# Patient Record
Sex: MALE | Employment: STUDENT | URBAN - METROPOLITAN AREA
[De-identification: names, ages, dates, MRNs, and addresses within clinical notes are randomized per-mention and may not be internally consistent; named-entity substitution may affect disease eponyms.]

---

## 2019-06-17 ENCOUNTER — NURSE TRIAGE (OUTPATIENT)
Dept: NURSING | Facility: CLINIC | Age: 24
End: 2019-06-17

## 2019-06-17 ENCOUNTER — APPOINTMENT (OUTPATIENT)
Dept: ULTRASOUND IMAGING | Facility: CLINIC | Age: 24
End: 2019-06-17
Attending: EMERGENCY MEDICINE
Payer: COMMERCIAL

## 2019-06-17 ENCOUNTER — HOSPITAL ENCOUNTER (EMERGENCY)
Facility: CLINIC | Age: 24
Discharge: HOME | End: 2019-06-17
Attending: EMERGENCY MEDICINE | Admitting: EMERGENCY MEDICINE
Payer: COMMERCIAL

## 2019-06-17 VITALS
DIASTOLIC BLOOD PRESSURE: 75 MMHG | HEART RATE: 73 BPM | TEMPERATURE: 98.2 F | OXYGEN SATURATION: 100 % | SYSTOLIC BLOOD PRESSURE: 125 MMHG | RESPIRATION RATE: 18 BRPM

## 2019-06-17 DIAGNOSIS — S86.811A STRAIN OF CALF MUSCLE, RIGHT, INITIAL ENCOUNTER: ICD-10-CM

## 2019-06-17 PROCEDURE — 99284 EMERGENCY DEPT VISIT MOD MDM: CPT | Mod: 25

## 2019-06-17 PROCEDURE — 93971 EXTREMITY STUDY: CPT | Mod: RT

## 2019-06-17 NOTE — ED AVS SNAPSHOT
[facility] Emergency Department  [address]  [facility] [address]  Phone:  [phone]  Fax:  [phone]                                    Micah Ulloa   MRN: [id number]    Department:  [facility] Emergency Department   Date of Visit:  6/17/2019           After Visit Summary Signature Page    I have received my discharge instructions, and my questions have been answered. I have discussed any challenges I see with this plan with the nurse or doctor.    ..........................................................................................................................................  Patient/Patient Representative Signature      ..........................................................................................................................................  Patient Representative Print Name and Relationship to Patient    ..................................................               ................................................  Date                                   Time    ..........................................................................................................................................  Reviewed by Signature/Title    ...................................................              ..............................................  Date                                               Time          22EPIC Rev 08/18

## 2019-06-17 NOTE — TELEPHONE ENCOUNTER
"Pt calls in with concern of right calf and top of foot swelling     States \" I think I sprained my ankle last week \"    The last few days pt has noticed increased \"swelling\" especially of right calf     Has been going to work ( today as well )- and has been working out with minimal discomfort    Today rates pain about 3/10 -right calf    Per protocol pt advised to have calf-foot evaluated by a provider hopefully today     Pt then states he is a [facility] patient    Pt again advised to be evaluated and then given [facility] Nurse Line to call them to see where would be best place within [facility] system to go and have leg evaluated    Protocol and care advice reviewed  Caller states understanding of the recommended disposition    Advised to call back if further questions or concerns   Robert Street , RN / [facility]      Additional Information    Negative: Major bleeding (actively dripping or spurting) that can't be stopped    Negative: Bullet, stabbed by knife, or other serious penetrating wound    Negative: Looks like a dislocated joint (crooked or deformed)    Negative: Can't stand (bear weight) or walk    Negative: Sounds like a life-threatening emergency to the triager    Negative: SEVERE pain (e.g. excruciating)    Negative: Skin is split open or gaping (or length > 1/2 inch or 12 mm)    Negative: Bleeding won't stop after 10 minutes of direct pressure (using correct technique)    Negative: Dirt in the wound and not removed with 15 minutes of scrubbing    Negative: Sounds like a serious injury to the triager    Negative: Looks infected (e.g., spreading redness, pus, red streak)    Large swelling or bruise and size > palm of person's hand    Protocols used: LEG INJURY-A-OH      "

## 2019-06-18 ASSESSMENT — ENCOUNTER SYMPTOMS: MYALGIAS: 1

## 2019-06-18 NOTE — DISCHARGE INSTRUCTIONS
Please make an appointment to follow up with your primary care provider in 7-10 days if not improving.

## 2019-06-18 NOTE — ED TRIAGE NOTES
Sprain right ankle about 1-2 weeks ago. Yesterday, patient noticed slight pain to right calf and worsen today with slight swelling, call nurse line and was advised to come to rule out blood clot. ABCs intact.

## 2019-06-18 NOTE — ED PROVIDER NOTES
History     Chief Complaint:  Leg pain    HPI   Micah Ulloa is a 24 year old male who presents with leg pain. The patient reported that approximately 2 weeks ago he was playing basketball when he jumped for a shot and someone hit him causing him to land on his ankle weird and cause an inversion sprain of his right ankle. The patient reported that yesterday he noticed that his right calf was swollen and warm to the touch with no associated leg pain. Nothing alleviates or exacerbates his symptoms. His mother prompted presentation to the ED with concern for blood clots. He has taken no medications for pain management.     Allergies:  The patient has no known drug allergies.     Medications:    The patient is currently on no regular medications.    Past Medical History:    The patient denies any significant past medical history.    Past Surgical History:    The patient does not have any pertinent past surgical history.    Family History:    No past pertinent family history.    Social History:  Negative for tobacco use.  Negative for alcohol use.  Negative for drug use.     Review of Systems   Cardiovascular: Positive for leg swelling.   Musculoskeletal: Positive for myalgias.   All other systems reviewed and are negative.      Physical Exam     Patient Vitals for the past 24 hrs:   BP Temp Temp src Pulse Heart Rate Resp SpO2   06/17/19 2029 125/75 98.2  F (36.8  C) Oral 73 73 18 100 %       Physical Exam    General:   Pleasant, age appropriate.      Resting comfortably in the bed.  Eyes:    Conjunctiva normal  Neck:    Supple, no meningismus.     CV:     Regular rate and rhythm.      No murmurs, rubs or gallops.       No unilateral leg swelling.       2+ DP pulses bilateral.       No lower extremity edema.  PULM:    Clear to auscultation bilateral.       No respiratory distress.      Good air exchange.  ABD:    Soft, non-tender, non-distended.       No pulsatile masses.       No rebound, guarding or rigidity.  MSK:      Right lower extremity:      No appreciable swelling.      No pain with compression of the calf.      Negative Toby's sign.      Compartments are soft and compressible.   LYMPH:   No cervical lymphadenopathy.  NEURO:   Alert and oriented x 3.      Strength and sensation intact to the lower extremities.  Skin:    Warm, dry and intact.    Psych:    Mood is good and affect is appropriate.      Emergency Department Course   Imaging:  Radiographic findings were communicated with the patient who voiced understanding of the findings.  US Lower Extremity Venous Duplex, right:  No evidence of deep venous thrombosis, as per radiology.     Emergency Department Course:  Nursing notes and vitals reviewed. () I performed an exam of the patient as documented above.     The patient was sent for a lower extremity US while in the emergency department, findings above.     Findings and plan explained to the Patient. Patient discharged home with instructions regarding supportive care, medications, and reasons to return. The importance of close follow-up was reviewed.   I personally reviewed the laboratory results with the Patient and answered all related questions prior to discharge.     Impression & Plan    Medical Decision Makin-year-old male presented to the ED with right calf pain after recent right ankle sprain.  On examination he has no evidence of arterial insufficiency.  Achilles tendon intact.  No bony tenderness to draw concern for underlying fracture.  Doppler ultrasound is negative for DVT.  Patient likely had some degree of gastrocnemius strain with his injury causing his calf pain today.  Supportive measures indicated.  Patient to follow with PCP if symptoms not improved.    Diagnosis:    ICD-10-CM    1. Strain of calf muscle, right, initial encounter S86.811A        Disposition:  discharged to home  Scribe Disclosure:  Kesha DAVIS, am serving as a scribe on 2019 at 12:22 AM to personally document  services performed by No att. providers found based on my observations and the provider's statements to me.         Kesha Mora  6/17/2019   [facility] EMERGENCY DEPARTMENT       Jaden Valadez MD  06/19/19 0749

## 2020-03-11 ENCOUNTER — NURSE TRIAGE (OUTPATIENT)
Dept: NURSING | Facility: CLINIC | Age: 25
End: 2020-03-11

## 2020-03-11 NOTE — TELEPHONE ENCOUNTER
"Patient calling reporting for the past 2 days \"I am getting more and more sick.\" Reporting similar symptoms \"to the Coronavirus.\"    Call disconnected prior to further information. Attempted to reach patient at  x 3 and call rolls directly to voice mail.     Shakira Xavier RN  [facility]      "

## 2020-03-11 NOTE — TELEPHONE ENCOUNTER
Micah calls and says that he is SOB and congested. Pt. Says that he also has a dry cough. Dry cough began 5 days ago. Pt. Refuses to go to the ER and wants to be tested for the Corona virus. Pt. Says that he will go to the [facility] clinic. 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 RN then called the [facility] and spoke to Kendra--who says that she has been trained to handle the [place] Virus. RN told Kendra about pt's car and his phone #. Kendra voiced understanding.  Reason for Disposition    [1] No CORONAVIRUS EXPOSURE BUT [2] questions about    [1] MODERATE difficulty breathing (e.g., speaks in phrases, SOB even at rest, pulse 100-120) AND [2] NEW-onset or WORSE than normal    Additional Information    Negative: Severe difficulty breathing (e.g., struggling for each breath, speak in single words, bluish lips)    Negative: Sounds like a life-threatening emergency to the triager    Negative: [1] Difficulty breathing (shortness of breath) occurs AND [2] onset > 14 days after CORONAVIRUS EXPOSURE (Close Contact)    Negative: [1] Dry cough occurs AND [2] onset > 14 days after CORONAVIRUS EXPOSURE    Negative: [1] Wet cough (i.e., white-yellow, yellow, green, or alok colored sputum) AND [2] onset > 14 days after CORONAVIRUS EXPOSURE    Negative: [1] Common cold symptoms AND [2] onset > 14 days after CORONAVIRUS EXPOSURE    Negative: [1] Difficulty breathing occurs AND [2] within 14 days of CORONAVIRUS EXPOSURE    Negative: Patient sounds very sick or weak to the triager    Negative: [1] Fever or feeling feverish AND [2] within 14 Days of CORONAVIRUS EXPOSURE    Negative: [1] Cough occurs AND [2] within 14 days of CORONAVIRUS EXPOSURE    Negative: [1] Fever or feeling feverish AND [2] symptoms of lower respiratory illness (e.g., cough, difficulty breathing) AND [3] TRAVEL FROM [place] within last 14 days    Negative:  [1] Body aches, chills, diarrhea, headache, runny nose, or sore throat occur AND [2] within 14 days of CORONAVIRUS EXPOSURE    Negative: [1] CORONAVIRUS EXPOSURE within last 14 days AND [2] NO cough, fever, or breathing difficulty    Negative: [1] TRAVEL FROM [place] in last 14 days AND  [2] NO cough or fever or breathing difficulty    Negative: [1] CORONAVIRUS EXPOSURE 15 or more days ago AND [2] NO cough or fever or breathing difficulty    Negative: [1] Breathing stopped AND [2] hasn't returned    Negative: Choking on something    Negative: Severe difficulty breathing (e.g., struggling for each breath, speaks in single words)    Negative: Bluish (or gray) lips or face now    Negative: Difficult to awaken or acting confused (e.g., disoriented, slurred speech)    Negative: Passed out (i.e., lost consciousness, collapsed and was not responding)    Negative: Wheezing started suddenly after medicine, an allergic food or bee sting    Negative: Stridor    Negative: Slow, shallow and weak breathing    Negative: Sounds like a life-threatening emergency to the triager    Negative: Chest pain    Negative: [1] Wheezing (high pitched whistling sound) AND [2] previous asthma attacks or use of asthma medicines    Negative: [1] Difficulty breathing AND [2] only present when coughing    Negative: [1] Difficulty breathing AND [2] only from stuffy or runny nose    Protocols used: CORONAVIRUS (2019-NCOV) EXPOSURE-A-AH, BREATHING DIFFICULTY-A-AH

## 2020-03-12 NOTE — TELEPHONE ENCOUNTER
Pt. Reports no symptoms, no travel or exposure.  Consulted with Dr. Arnold as to testing protocol  Found Pt. Was not in need of testing.  Informed Pt. To self quarantine and if condition worsens to go to [email or web address],    Sil Godinez CMA on 3/11/2020 at 7:49 PM

## 2020-03-24 ENCOUNTER — NURSE TRIAGE (OUTPATIENT)
Dept: NURSING | Facility: CLINIC | Age: 25
End: 2020-03-24

## 2020-03-24 ENCOUNTER — VIRTUAL VISIT (OUTPATIENT)
Dept: FAMILY MEDICINE | Facility: OTHER | Age: 25
End: 2020-03-24

## 2020-03-24 NOTE — TELEPHONE ENCOUNTER
Last week Micah had a cold.  Went to [facility] and was told to stay home.  Yesterday having pain in chest and tight breathing.  Today is coughing dry.   Hears  a wheezing noise.  Micah feels he has a fever.  Micah is also having shortness of breath.      Instructions for Patients  It is recommended that you setup a virtual visit with one of our virtual providers.  To do this follow these instructions:    1. Go to the website [email or web address]  2. Create an account (you will need your insurance information)  3. Start a new visit  4. Choose your diagnosis (e.g. COVID19)  5. Fill out the information about your symptoms  6. A provider will reach out to you by text, phone call or video visit based on your request    While you are at home please follow these instructions to care for yourself:    Regardless of if you have been tested or not:  Patient who have symptoms (cough, fever, or shortness of breath), need to isolate for 7 days from when symptoms started OR 72 hours after fever resolves (without fever reducing medications) AND improvement of respiratory symptoms (whichever is longer).      Isolate yourself at home (in own room/own bathroom if possible)    Do Not allow any visitors    Do Not go to work or school    Do Not go to [employer],  centers, shopping, or other public places.    Do Not shake hands.    Avoid close and intimate contact with others (hugging, kissing).    Follow CDC recommendations for household cleaning of frequently touched services.     After the initial 7 days, continue to isolate yourself from household members as much as possible. To continue decrease the risk of community spread and exposure, you and any members of your household should limit activities in public for 14 days after starting home isolation.     You can reference the following CDC link for helpful home isolation/care tips:  https://www.cdc.gov/coronavirus/2019-ncov/downloads/10Things.pdf    Protect Others:    Cover Your  Mouth and Nose with a mask, disposable tissue or wash cloth to avoid spreading germs to others.    Wash your hands and face frequently with soap and water.    Fever Medicines:    For fever relief, take acetaminophen or ibuprofen.    Treat fevers above 101  F (38.3  C) to lower fevers and make you more comfortable.     Acetaminophen (e.g., Tylenol): Take 650 mg (two 325 mg pills) by mouth every 4-6 hours as needed of regular strength Tylenol or 1,000 mg (two 500 mg pills) every 8 hours as needed of Extra Strength Tylenol.     Ibuprofen (e.g., Motrin, Advil): Take 400 mg (two 200 mg pills) by mouth every 6 hours as needed.     Acetaminophen is thought to be safer than ibuprofen or naproxen for people over 65 years old. Acetaminophen is in many OTC and prescription medicines. It might be in more than one medicine that you are taking. You need to be careful and not take an overdose. Before taking any medicine, read all the instructions on the package.    Caution - NSAIDs (e.g., ibuprofen, naproxen): Do not take nonsteroidal anti-inflammatory drugs (NSAIDs) if you have stomach problems, kidney disease, heart failure, or other contraindications to using this type of medicine. Do not take NSAID medicines for over 7 days without consulting your PCP. Do not take NSAID medicines if you are pregnant. Do not take NSAID medicines if you are also taking blood thinners.     Call Back If: Breathing difficulty develops or you become worse.    Thank you for limiting contact with others, wearing a simple mask to cover your cough, practice good hand hygiene habits and accessing our virtual services where possible to limit the spread of this virus.    For more information about COVID19 and options for caring for yourself at home, please visit the CDC website at https://www.cdc.gov/coronavirus/2019-ncov/about/steps-when-sick.html  For more options for care at [facility], please visit our website at  [email or web address]       Reason for Disposition    [1] MILD longstanding difficulty breathing AND [2]  SAME as normal    Additional Information    Negative: [1] Breathing stopped AND [2] hasn't returned    Negative: Choking on something    Negative: Severe difficulty breathing (e.g., struggling for each breath, speaks in single words)    Negative: Bluish (or gray) lips or face now    Negative: Difficult to awaken or acting confused (e.g., disoriented, slurred speech)    Negative: Passed out (i.e., lost consciousness, collapsed and was not responding)    Negative: Wheezing started suddenly after medicine, an allergic food or bee sting    Negative: Stridor    Negative: Slow, shallow and weak breathing    Negative: Sounds like a life-threatening emergency to the triager    Negative: [1] MODERATE difficulty breathing (e.g., speaks in phrases, SOB even at rest, pulse 100-120) AND [2] NEW-onset or WORSE than normal    Negative: Wheezing can be heard across the room    Negative: Drooling or spitting out saliva (because can't swallow)    Negative: History of prior \"blood clot\" in leg or lungs (i.e., deep vein thrombosis, pulmonary embolism)    Negative: History of inherited increased risk of blood clots (e.g., Factor 5 Leiden, Anti-thrombin 3, Protein C or Protein S deficiency, Prothrombin mutation)    Negative: Recent illness requiring prolonged bedrest (i.e., immobilization)    Negative: Hip or leg fracture in past 2 months (e.g., had cast on leg or ankle)    Negative: Major surgery in the past month    Negative: Recent long-distance travel with prolonged time in car, bus, plane, or train (i.e., within past 2 weeks; 6 or  more hours duration)    Negative: Extra heart beats OR irregular heart beating   (i.e., \"palpitations\")    Negative: Fever > 103 F (39.4 C)    Negative: [1] Fever > 101 F (38.3 C) AND [2] age > 60    Negative: [1] Fever > 100.0 F (37.8 C) AND [2] bedridden (e.g., nursing " home patient, CVA, chronic illness, recovering from surgery)    Negative: [1] Fever > 100.0 F (37.8 C) AND [2] diabetes mellitus or weak immune system (e.g., HIV positive, cancer chemo, splenectomy, chronic steroids)    Negative: [1] Periods where breathing stops and then resumes normally AND [2] bedridden (e.g., nursing home patient, CVA)    Negative: Pregnant or postpartum (< 1 month since delivery)    Negative: Patient sounds very sick or weak to the triager    Negative: [1] MILD difficulty breathing (e.g., minimal/no SOB at rest, SOB with walking, pulse <100) AND [2] NEW-onset or WORSE than normal    Negative: [1] Longstanding difficulty breathing (e.g., CHF, COPD, emphysema) AND [2] WORSE than normal    Negative: [1] Longstanding difficulty breathing AND [2] not responding to usual therapy    Negative: [1] Continuous (nonstop) coughing AND [2] keeps from working or sleeping    Negative: [1] MODERATE longstanding difficulty breathing (e.g., speaks in phrases, SOB even at rest, pulse 100-120) AND [2] SAME as normal    Protocols used: BREATHING DIFFICULTY-A-AH

## 2020-03-25 NOTE — PROGRESS NOTES
"Date: 2020 19:32:21  Clinician: Rebekah Negrete  Clinician NPI: [id number]  Patient: Micah Ulloa  Patient : 1995  Patient Address: [address] [place], [place] [address] [place] [address]  Patient Phone: [phone]  Visit Protocol: URI  Patient Summary:  Micah is a 24 year old ( : 1995 ) male who initiated a Visit for COVID-19 (Coronavirus) evaluation and screening. When asked the question \"Please sign me up to receive news, health information and promotions from [employer].\", Micah responded \"No\".    Micah states his symptoms started 1-2 days ago.   His symptoms consist of myalgia, wheezing, a sore throat, a cough, malaise, chills, and a headache. He is experiencing mild difficulty breathing with activities but can speak normally in full sentences. Micah also feels feverish but was unable to measure his temperature.   Symptom details     Cough: Micah coughs almost every minute and his cough is more bothersome at night. Phlegm comes into his throat when he coughs. He does not believe his cough is caused by post-nasal drip. The color of the phlegm is clear, yellow, and white.     Sore throat: Micah reports having moderate throat pain (4-6 on a 10 point pain scale), does not have exudate on his tonsils, and can swallow liquids. He is not sure if the lymph nodes in his neck are enlarged. A rash has not appeared on the skin since the sore throat started.     Wheezing: Micah has not ever been diagnosed with asthma. The wheezing interferes with his normal daily activities.    Headache: He states the headache is moderate (4-6 on a 10 point pain scale).      Micah denies having ear pain, rhinitis, facial pain or pressure, nasal congestion, and teeth pain. He also denies having a sinus infection within the past year, taking antibiotic medication for the symptoms, and having recent facial or sinus surgery in the past 60 days.   Precipitating events  Micah is not sure if he has been exposed to someone with strep throat. " He has recently been exposed to someone with influenza. Micah has been in close contact with the following high risk individuals: adults 65 or older.   Pertinent COVID-19 (Coronavirus) information  Micah has not traveled internationally or to the areas where COVID-19 (Coronavirus) is widespread, including cruise ship travel in the last 14 days before the start of his symptoms.   Micah has not had a close contact with a laboratory-confirmed COVID-19 patient within 14 days of symptom onset. He has had a close contact with a suspected COVID-19 patient within 14 days of symptom onset. Additional information about contact with COVID-19 (Coronavirus) patient as reported by the patient (free text): My little cousin is extremely sick and for some reason my aunt brought him over for family dinner. I didn't know he was sick until he started coughing and his mom brought up an ear infection.   Micah is not a healthcare worker and does not work in a healthcare facility.   Triage Point(s) temporarily suspended for COVID-19 (Coronavirus) screening  Micah reported the following symptoms which were previously protocol referral points. These protocol referral points have temporarily been removed for purposes of COVID-19 (Coronavirus) screening.   Wheezing that keeps Micah from doing daily activities   Pertinent medical history  Micah does not need a return to work/school note.   Weight: 160 lbs   Micah does not smoke or use smokeless tobacco.   Weight: 160 lbs    MEDICATIONS: No current medications, ALLERGIES: amoxicillin  Clinician Response:  Dear Micah,   Based on the information you have provided, you do have symptoms that are consistent with Coronavirus (COVID-19).  The coronavirus causes mild to severe respiratory illness with the most common symptoms including fever, cough and difficulty breathing. Unfortunately, many viruses cause similar symptoms and it can be difficult to distinguish between viruses, especially in  mild cases, so we are presuming that anyone with cough or fever has coronavirus at this time.  Coronavirus/COVID-19 has reached the point of community spread in [place], meaning that we are finding the virus in people with no known exposure risk for liza the virus. Given the increasing commonness of coronavirus in the community we are no longer testing patients who are not critically ill.  If you are a health care worker, you should refer to your employee health office for instructions about testing and returning to work.  For everyone else who has cough or fever, you should assume you are infected with coronavirus. Since you will not be tested but have symptoms that may be consistent with coronavirus, the CDC recommends you stay in self-isolation until these three things have happened:    You have had no fever for at least 72 hours (that is three full days of no fever without the use of medicine that reduces fevers)    AND   Other symptoms have improved (for example, when your cough or shortness of breath have improved)   AND   At least 7 days have passed since your symptoms first appeared.   How to Isolate:   Isolate yourself at home.  Do Not allow any visitors  Do Not go to work or school  Do Not go to [employer],  centers, shopping, or other public places.  Do Not shake hands.  Avoid close contact with others (hugging, kissing).   Protect Others:   Cover Your Mouth and Nose with a mask, disposable tissue or wash cloth to avoid spreading germs to others.  Wash your hands and face frequently with soap and water.   We know it can be scary to hear that you might have COVID-19. Our team can help track your symptoms and make sure you are doing ok over the next two weeks using a program called [employer] to keep in touch. When you receive an email from [employer], please consider enrolling in our monitoring program. There is no cost to you for monitoring. Here is a URL where you can learn more:  [email or web address]  Managing Symptoms:   At this time, we primarily recommend Tylenol (Acetaminophen) for fever or pain. If you have liver or kidney problems, contact your primary care provider for instructions on use of tylenol. Adults can take 650 mg (two 325 mg pills) by mouth every 4-6 hours as needed OR 1,000 mg (two 500 mg pills) every 8 hours as needed. MAXIMUM DAILY DOSE: 3,000mg. For children, refer to dosing on bottle based on age or weight.   If you develop significant shortness of breath that prevents you from doing normal activities, please call 911 or proceed to the nearest emergency room and alert them immediately that you have been in self-isolation for possible coronavirus.  For more information about COVID19 and options for caring for yourself at home, please visit the CDC website at https://www.cdc.gov/coronavirus/2019-ncov/about/steps-when-sick.htmlFor more options for care at [facility], please visit our website at [email or web address]    Diagnosis: Coronavirus infection, unspecified  Diagnosis ICD: B34.2  Prescription: albuterol sulfate (Proventil HFA) 90 mcg/actuation inhalation HFA aerosol inhaler 1 200 inhalation aerosol with adapter (proventil hfa or equivalent), 0 days supply. Inhale 2 puffs every 4 hours as needed. Refills: 0, Refill as needed: no, Allow substitutions: yes

## 2020-05-15 ENCOUNTER — NURSE TRIAGE (OUTPATIENT)
Dept: NURSING | Facility: CLINIC | Age: 25
End: 2020-05-15

## 2020-05-15 NOTE — TELEPHONE ENCOUNTER
"Pt states he is feeling weird. States he has never felt like this before  States it is \"super weird\"  Head is tingling, feeling dizzy this morning. Feeling x2-3 days.   Feeling confused.   States he feels super lightheaded.   BP was 110/68  Per protocol and patient's description of symptoms RN recommended ED evaluation.  Pt agreed to plan, is concerned about COVID 19.  RN discussed no visitor policy at ED, recommended patient have someone drive him to the ED for evaluation    Pt stated understanding.    Tresa Clark RN   [facility] RN Triage       COVID 19 Nurse Triage Plan/Patient Instructions    Please be aware that novel coronavirus (COVID-19) may be circulating in the community. If you develop symptoms such as fever, cough, or SOB or if you have concerns about the presence of another infection including coronavirus (COVID-19), please contact your health care provider or visit [email or web address].     Disposition/Instructions    Patient to go to ED and follow protocol based instructions. Follow System Ambulatory Workflow for COVID 19.     Bring Your Own Device:  Please also bring your smart device(s) (smart phones, tablets, laptops) and their charging cables for your personal use and to communicate with your care team during your visit.    Thank you for limiting contact with others, wearing a simple mask to cover your cough, practice good hand hygiene habits and accessing our virtual services where possible to limit the spread of this virus.    For more information about COVID19 and options for caring for yourself at home, please visit the CDC website at https://www.cdc.gov/coronavirus/2019-ncov/about/steps-when-sick.html  For more options for care at [facility], please visit our website at [email or web address]    For more information, please use the [place] Department of Health COVID-19 Website: [email or web address].[email or web address]/diseases/coronavirus/index.html  [place] " Department of Health ([facility]) COVID-19 Hotlines (Interpreters available):      Health questions: Phone Number: [phone] or [phone] and Hours: 7 a.m. to 7 p.m.    Schools and  questions: Phone Number: [phone] or [phone] and Hours 7 a.m. to 7 p.m.                    Reason for Disposition    Headache (with neurologic deficit)    Additional Information    Negative: Difficult to awaken or acting confused (e.g., disoriented, slurred speech)    Negative: New neurologic deficit that is present NOW, sudden onset of ANY of the following: * Weakness of the face, arm, or leg on one side of the body * Numbness of the face, arm, or leg on one side of the body * Loss of speech or garbled speech    Negative: Sounds like a life-threatening emergency to the triager    Negative: Confusion, disorientation, or hallucinations is the main symptom    Negative: Dizziness is the main symptom    Negative: Followed a head injury within last 3 days    Negative: New neurologic deficit that is present now: * Weakness of the face, arm, or leg on one side of the body * Numbness of the face, arm, or leg on one side of the body * Loss of speech or garbled speech    Negative: Shock suspected (e.g., cold/pale/clammy skin, too weak to stand, low BP, rapid pulse)    Negative: Difficult to awaken or acting confused (e.g., disoriented, slurred speech)    Negative: Fainted, and still feels dizzy afterwards    Negative: Severe difficulty breathing (e.g., struggling for each breath, speaks in single words)    Negative: Overdose (accidental or intentional) of medications    Negative: Heart beating < 50 beats per minute OR > 140 beats per minute    Negative: Sounds like a life-threatening emergency to the triager    Negative: SEVERE dizziness (e.g., unable to stand, requires support to walk, feels like passing out now)    Negative: Spinning or tilting sensation (vertigo) present now and one or more stroke risk factors (i.e.,  hypertension, diabetes, prior stroke/TIA, heart attack, age over 60) (Exception: prior physician evaluation for this AND no different/worse than usual)    Negative: SEVERE headache or neck pain    Loss of vision or double vision    Protocols used: NEUROLOGIC DEFICIT-A-OH, DIZZINESS-A-OH